# Patient Record
(demographics unavailable — no encounter records)

---

## 2024-10-16 NOTE — HISTORY OF PRESENT ILLNESS
[FreeTextEntry2] : 89-year-old PMHX  COPD, of breast cancer x 3 s/p right-sided mastectomy, recently diagnosed throat cancer s/p RT, completed 8/22, right shoulder reconstruction, left forearm and elbow surgery with jayson replacement ended up in ER on  9/17/2024 after fall at home found to have Right femoral neck fx. S/p Right hip Hemiarthroplasty with Dr Sharp on 9/18/2024.  Pt started on [facility] course sig for hyponatremia (128) treated fluid restriction, evaluated by PM&R and acute rehabilitation was recommended. Patient discharge to [facility] IRU on 9/20/24.  Pt notes feels fatigue lack of sleep    Med Reconciliation: acetaminophen 325 mg oral tablet: 2 tab(s) orally every 8 apixaban 2.5 mg oral tablet: 1 tab(s) orally every 12 hours folic acid 1 mg oral tablet: 1 tab(s) orally once a day melatonin 3 mg oral tablet: 1 tab(s) orally once a day (at bedtime)  methocarbamol 500 mg oral tablet: 1 tab(s) orally 2 times a day oxyCODONE 5 mg oral tablet: 1 tab(s) orally 2 times a day as needed for Severe pantoprazole 40 mg oral delayed release tablet: 1 tab(s) orally  polyethylene glycol 3350 oral powder for reconstitution: 17 gram(s) orally  rosuvastatin 20 mg oral tablet: 1 tab(s) orally once a day (at bedtime) senna leaf extract oral tablet: 2 tab(s) orally once a day (at bedtime) tiotropium 2.5 mcg/inh inhalation aerosol: 2 puff(s) inhaled once a day Vitamin C 1000 mg oral tablet: 1 tab(s) orally once a day vitamin D-calcium (as carbonate) 5 mcg-500 mg oral tablet: 1 tab(s) orally once  a day

## 2024-10-16 NOTE — ASSESSMENT
[FreeTextEntry1] :  I am providing continuous care that includes testing, discussion of treatment options and shared decision making on diagnosis chosen treatment.

## 2024-10-16 NOTE — PHYSICAL EXAM
[No Acute Distress] : no acute distress [No JVD] : no jugular venous distention [Normal] : soft, non-tender, non-distended, no masses palpated, no HSM and normal bowel sounds [de-identified] : well healed scanr no redness or swelling  [de-identified] : unsteady gait

## 2024-10-16 NOTE — HEALTH RISK ASSESSMENT
[Intercurrent hospitalizations] : was admitted to the hospital  [Any fall with injury in past year] : Patient reported fall with injury in the past year [0] : 1) Little interest or pleasure doing things: Not at all (0) [de-identified] : RTHR [de-identified] : ortho [de-identified] : fell fractured right hip